# Patient Record
Sex: MALE | Race: BLACK OR AFRICAN AMERICAN
[De-identification: names, ages, dates, MRNs, and addresses within clinical notes are randomized per-mention and may not be internally consistent; named-entity substitution may affect disease eponyms.]

---

## 2017-08-26 ENCOUNTER — HOSPITAL ENCOUNTER (EMERGENCY)
Dept: HOSPITAL 61 - ER | Age: 27
Discharge: HOME | End: 2017-08-26
Payer: SELF-PAY

## 2017-08-26 VITALS — BODY MASS INDEX: 24.38 KG/M2 | HEIGHT: 72 IN | WEIGHT: 180 LBS

## 2017-08-26 VITALS — DIASTOLIC BLOOD PRESSURE: 87 MMHG | SYSTOLIC BLOOD PRESSURE: 149 MMHG

## 2017-08-26 DIAGNOSIS — M25.512: ICD-10-CM

## 2017-08-26 DIAGNOSIS — M24.412: Primary | ICD-10-CM

## 2017-08-26 DIAGNOSIS — G89.29: ICD-10-CM

## 2017-08-26 PROCEDURE — 99284 EMERGENCY DEPT VISIT MOD MDM: CPT

## 2017-08-26 PROCEDURE — 73030 X-RAY EXAM OF SHOULDER: CPT

## 2017-08-26 NOTE — RAD
SHOULDER 2+V LEFT



History:pain, prior dislocation    



Comparison: None



Findings:3 views left shoulder are submitted. No acute fracture or dislocation

is identified. 



Impression:

1.No acute abnormality is identified.
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
## 2021-02-08 ENCOUNTER — HOSPITAL ENCOUNTER (EMERGENCY)
Dept: HOSPITAL 61 - ER | Age: 31
Discharge: HOME | End: 2021-02-08
Payer: COMMERCIAL

## 2021-02-08 VITALS
DIASTOLIC BLOOD PRESSURE: 56 MMHG | SYSTOLIC BLOOD PRESSURE: 119 MMHG | DIASTOLIC BLOOD PRESSURE: 56 MMHG | SYSTOLIC BLOOD PRESSURE: 119 MMHG

## 2021-02-08 VITALS — BODY MASS INDEX: 25.25 KG/M2 | HEIGHT: 71 IN | WEIGHT: 180.34 LBS
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
## 2021-02-08 DIAGNOSIS — S13.4XXA: Primary | ICD-10-CM
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
## 2021-02-08 DIAGNOSIS — R51.9: ICD-10-CM
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
## 2021-02-08 DIAGNOSIS — M25.512: ICD-10-CM

## 2021-02-08 DIAGNOSIS — F12.90: ICD-10-CM

## 2021-02-08 PROCEDURE — 99285 EMERGENCY DEPT VISIT HI MDM: CPT

## 2021-02-08 PROCEDURE — 73030 X-RAY EXAM OF SHOULDER: CPT

## 2021-02-08 PROCEDURE — 72125 CT NECK SPINE W/O DYE: CPT

## 2021-02-08 PROCEDURE — 70450 CT HEAD/BRAIN W/O DYE: CPT
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
## 2021-08-21 ENCOUNTER — HOSPITAL ENCOUNTER (EMERGENCY)
Dept: HOSPITAL 61 - ER | Age: 31
Discharge: LEFT BEFORE DISCHARGE | End: 2021-08-21
Payer: SELF-PAY

## 2021-08-21 DIAGNOSIS — U07.1: Primary | ICD-10-CM

## 2021-08-21 DIAGNOSIS — K64.9: ICD-10-CM

## 2021-08-21 DIAGNOSIS — Z53.21: ICD-10-CM

## 2021-08-31 ENCOUNTER — HOSPITAL ENCOUNTER (EMERGENCY)
Dept: HOSPITAL 61 - ER | Age: 31
Discharge: HOME | End: 2021-08-31
Payer: SELF-PAY

## 2021-08-31 VITALS — DIASTOLIC BLOOD PRESSURE: 68 MMHG | SYSTOLIC BLOOD PRESSURE: 119 MMHG

## 2021-08-31 VITALS — WEIGHT: 185.19 LBS | BODY MASS INDEX: 26.51 KG/M2 | HEIGHT: 70 IN

## 2021-08-31 DIAGNOSIS — K62.89: Primary | ICD-10-CM

## 2021-08-31 PROCEDURE — 99282 EMERGENCY DEPT VISIT SF MDM: CPT
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
